# Patient Record
Sex: MALE | Race: WHITE | Employment: FULL TIME | URBAN - METROPOLITAN AREA
[De-identification: names, ages, dates, MRNs, and addresses within clinical notes are randomized per-mention and may not be internally consistent; named-entity substitution may affect disease eponyms.]

---

## 2021-02-19 RX ORDER — ESOMEPRAZOLE MAGNESIUM 40 MG/1
40 CAPSULE, DELAYED RELEASE ORAL
COMMUNITY
End: 2021-03-03

## 2021-02-22 ENCOUNTER — HOSPITAL ENCOUNTER (INPATIENT)
Dept: INTERVENTIONAL RADIOLOGY/VASCULAR | Facility: HOSPITAL | Age: 61
LOS: 1 days | Discharge: HOME | DRG: 247 | End: 2021-02-24
Attending: INTERNAL MEDICINE | Admitting: INTERNAL MEDICINE
Payer: COMMERCIAL

## 2021-02-22 DIAGNOSIS — R07.2 PRECORDIAL PAIN: ICD-10-CM

## 2021-02-22 DIAGNOSIS — I10 HYPERTENSION, UNSPECIFIED TYPE: ICD-10-CM

## 2021-02-22 PROCEDURE — 99152 MOD SED SAME PHYS/QHP 5/>YRS: CPT

## 2021-02-22 PROCEDURE — 92921 HC PTCA EA ADDL MAJOR CORONARY ARTERY/BRANCH: CPT

## 2021-02-22 PROCEDURE — 93010 ELECTROCARDIOGRAM REPORT: CPT | Performed by: INTERNAL MEDICINE

## 2021-02-22 PROCEDURE — 027135Z DILATION OF CORONARY ARTERY, TWO ARTERIES WITH TWO DRUG-ELUTING INTRALUMINAL DEVICES, PERCUTANEOUS APPROACH: ICD-10-PCS | Performed by: INTERNAL MEDICINE

## 2021-02-22 PROCEDURE — 99153 MOD SED SAME PHYS/QHP EA: CPT

## 2021-02-22 PROCEDURE — 99211 OFF/OP EST MAY X REQ PHY/QHP: CPT

## 2021-02-22 PROCEDURE — 4A023N7 MEASUREMENT OF CARDIAC SAMPLING AND PRESSURE, LEFT HEART, PERCUTANEOUS APPROACH: ICD-10-PCS | Performed by: INTERNAL MEDICINE

## 2021-02-22 PROCEDURE — 93458 L HRT ARTERY/VENTRICLE ANGIO: CPT

## 2021-02-22 PROCEDURE — B2111ZZ FLUOROSCOPY OF MULTIPLE CORONARY ARTERIES USING LOW OSMOLAR CONTRAST: ICD-10-PCS | Performed by: INTERNAL MEDICINE

## 2021-02-22 PROCEDURE — B2151ZZ FLUOROSCOPY OF LEFT HEART USING LOW OSMOLAR CONTRAST: ICD-10-PCS | Performed by: INTERNAL MEDICINE

## 2021-02-22 PROCEDURE — 93005 ELECTROCARDIOGRAM TRACING: CPT

## 2021-02-22 RX ORDER — MIDAZOLAM HYDROCHLORIDE 1 MG/ML
INJECTION INTRAMUSCULAR; INTRAVENOUS
Status: COMPLETED
Start: 2021-02-22 | End: 2021-02-22

## 2021-02-22 RX ORDER — CLOPIDOGREL BISULFATE 75 MG/1
75 TABLET ORAL DAILY
Status: DISCONTINUED | OUTPATIENT
Start: 2021-02-23 | End: 2021-02-24

## 2021-02-22 RX ORDER — CLOPIDOGREL BISULFATE 75 MG/1
75 TABLET ORAL DAILY
Qty: 90 TABLET | Refills: 3 | Status: SHIPPED | OUTPATIENT
Start: 2021-02-22 | End: 2021-04-02

## 2021-02-22 RX ORDER — VERAPAMIL HYDROCHLORIDE 2.5 MG/ML
INJECTION, SOLUTION INTRAVENOUS
Status: COMPLETED
Start: 2021-02-22 | End: 2021-02-22

## 2021-02-22 RX ORDER — PANTOPRAZOLE SODIUM 40 MG/1
40 TABLET, DELAYED RELEASE ORAL
Status: DISCONTINUED | OUTPATIENT
Start: 2021-02-23 | End: 2021-02-24

## 2021-02-22 RX ORDER — ASPIRIN 81 MG/1
81 TABLET ORAL DAILY
Status: DISCONTINUED | OUTPATIENT
Start: 2021-02-23 | End: 2021-02-23

## 2021-02-22 RX ORDER — ROSUVASTATIN CALCIUM 20 MG/1
20 TABLET, COATED ORAL NIGHTLY
Qty: 30 TABLET | Refills: 5 | Status: SHIPPED | OUTPATIENT
Start: 2021-02-22 | End: 2021-04-02

## 2021-02-22 RX ORDER — SODIUM CHLORIDE 9 MG/ML
INJECTION, SOLUTION INTRAVENOUS
Status: COMPLETED | OUTPATIENT
Start: 2021-02-23 | End: 2021-02-22

## 2021-02-22 RX ORDER — ACETAMINOPHEN 325 MG/1
TABLET ORAL
Status: COMPLETED
Start: 2021-02-22 | End: 2021-02-22

## 2021-02-22 RX ORDER — IBUPROFEN 200 MG
TABLET ORAL
Status: COMPLETED
Start: 2021-02-22 | End: 2021-02-22

## 2021-02-22 RX ORDER — ASPIRIN 81 MG/1
TABLET, CHEWABLE ORAL
Status: COMPLETED
Start: 2021-02-22 | End: 2021-02-22

## 2021-02-22 RX ORDER — NITROGLYCERIN 20 MG/100ML
INJECTION INTRAVENOUS
Status: COMPLETED
Start: 2021-02-22 | End: 2021-02-22

## 2021-02-22 RX ORDER — ASPIRIN 81 MG/1
81 TABLET ORAL DAILY
Qty: 90 TABLET | Refills: 3 | Status: SHIPPED | OUTPATIENT
Start: 2021-02-22 | End: 2021-02-24

## 2021-02-22 RX ORDER — LIDOCAINE HYDROCHLORIDE 10 MG/ML
INJECTION, SOLUTION EPIDURAL; INFILTRATION; INTRACAUDAL; PERINEURAL
Status: COMPLETED
Start: 2021-02-22 | End: 2021-02-22

## 2021-02-22 RX ORDER — ASPIRIN 81 MG/1
324 TABLET, CHEWABLE ORAL ONCE
Status: COMPLETED | OUTPATIENT
Start: 2021-02-22 | End: 2021-02-22

## 2021-02-22 RX ORDER — HEPARIN SODIUM 5000 [USP'U]/ML
INJECTION, SOLUTION INTRAVENOUS; SUBCUTANEOUS
Status: COMPLETED
Start: 2021-02-22 | End: 2021-02-22

## 2021-02-22 RX ORDER — CLOPIDOGREL BISULFATE 75 MG/1
TABLET ORAL
Status: COMPLETED
Start: 2021-02-22 | End: 2021-02-22

## 2021-02-22 RX ORDER — SODIUM CHLORIDE 9 MG/ML
INJECTION, SOLUTION INTRAVENOUS CONTINUOUS
Status: ACTIVE | OUTPATIENT
Start: 2021-02-22 | End: 2021-02-22

## 2021-02-22 RX ADMIN — IBUPROFEN 400 MG: 200 MG TABLET ORAL at 15:36:00

## 2021-02-22 RX ADMIN — SODIUM CHLORIDE: 9 INJECTION, SOLUTION INTRAVENOUS at 09:21:00

## 2021-02-22 RX ADMIN — ASPIRIN 324 MG: 81 TABLET, CHEWABLE ORAL at 09:30:00

## 2021-02-22 RX ADMIN — ACETAMINOPHEN 650 MG: 325 TABLET ORAL at 14:16:00

## 2021-02-22 NOTE — H&P
Patient seen and examined independently. H and P reviewed. No changes in H and P. Risks and benefits of procedure were discussed with patient. Airway examined. Patient is ASA class 2 and mallampati class 2. Pt is appropriate for conscious sedation.  No his Radial     2. Essential hypertension  - continue lisinopril        3.  Screening, lipid  - lipids today.           Follow-up: 1 month        HPI:    Shanika Mcmullen is a 64year old male who is referred for evaluation of chest pain.     Very nice patient of no acute distress  HEENT: Extraocular movements are intact; sclerae are anicteric; scalp is atrauamatic; no thyromegaly  Neck: Supple; no JVD; no carotid bruits  Cardiac: Regular rate and regular rhythm; no murmurs/rubs/gallops are appreciated; PMI is non-

## 2021-02-22 NOTE — PROGRESS NOTES
Rc'd pt from cath lab in stable condition s/p PCI to LAD. VSS. VascBand to right wrist in place with 11mls of air. Good pleth and pulse. No bleeding or hematoma, soft. EKG done. Dr Dionna Shaver at bedside along with wife. Pt tolerating po intake well.      14:00: Pt

## 2021-02-22 NOTE — PROCEDURES
[address] [facility] Location: Cath Lab    CSN [id number] MRN [id number]   Admission Date 2/22/2021 Procedure Date 2/22/2021   Attending Physician Austen Méndez MD Procedure Physician Ladi Whittington MD         CARDIAC CATHETERIZATION/PERCU pullback of the catheter. 2.  Selective coronary angiography:      Left main artery: The left main artery is a medium caliber, bifurcating vessel without significant angiographic disease.      LAD:  The left anterior descending artery is a medium caliber motion, no mitral regurgitation  2. Coronary angiography:  right dominant system  - LM: no significant angiographic disease  - LAD: 80% mid stenosis, 50% distal stenosis  - LCx: mild diffuse disease  - RCA: 50% distal stenosis  3.  Percutaneous coronary in

## 2021-02-22 NOTE — DIETARY NOTE
Clinical Nutrition    Dietitian consult received per cardiac rehab standing order. Pt to be educated by cardiac rehab staff and encouraged to attend outpatient classes taught by RD. SKYLA available PRN.     Sujata Mcgraw MS, RD, LDN  Clinical Dietitian  Page

## 2021-02-22 NOTE — CARDIAC REHAB
Cardiac rehab education completed with patient  Stent card given to patient  Patient referred to cardiac rehab  Patient to call cardiac rehab to schedule first appt

## 2021-02-23 ENCOUNTER — APPOINTMENT (OUTPATIENT)
Dept: CV DIAGNOSTICS | Facility: HOSPITAL | Age: 61
DRG: 247 | End: 2021-02-23
Attending: INTERNAL MEDICINE
Payer: COMMERCIAL

## 2021-02-23 ENCOUNTER — APPOINTMENT (OUTPATIENT)
Dept: GENERAL RADIOLOGY | Facility: HOSPITAL | Age: 61
DRG: 247 | End: 2021-02-23
Attending: PHYSICIAN ASSISTANT
Payer: COMMERCIAL

## 2021-02-23 ENCOUNTER — APPOINTMENT (OUTPATIENT)
Dept: INTERVENTIONAL RADIOLOGY/VASCULAR | Facility: HOSPITAL | Age: 61
DRG: 247 | End: 2021-02-23
Attending: INTERNAL MEDICINE
Payer: COMMERCIAL

## 2021-02-23 LAB
ATRIAL RATE: 48 BPM
ATRIAL RATE: 52 BPM
ATRIAL RATE: 62 BPM
CRP SERPL-MCNC: 1.35 MG/DL (ref ?–0.3)
P AXIS: 71 DEGREES
P AXIS: 71 DEGREES
P AXIS: 76 DEGREES
P-R INTERVAL: 132 MS
P-R INTERVAL: 138 MS
P-R INTERVAL: 140 MS
Q-T INTERVAL: 394 MS
Q-T INTERVAL: 432 MS
Q-T INTERVAL: 452 MS
QRS DURATION: 106 MS
QRS DURATION: 106 MS
QRS DURATION: 108 MS
QTC CALCULATION (BEZET): 399 MS
QTC CALCULATION (BEZET): 401 MS
QTC CALCULATION (BEZET): 403 MS
R AXIS: 59 DEGREES
R AXIS: 70 DEGREES
R AXIS: 73 DEGREES
SED RATE-ML: 8 MM/HR
T AXIS: 42 DEGREES
T AXIS: 50 DEGREES
T AXIS: 61 DEGREES
VENTRICULAR RATE: 48 BPM
VENTRICULAR RATE: 52 BPM
VENTRICULAR RATE: 62 BPM

## 2021-02-23 PROCEDURE — B2111ZZ FLUOROSCOPY OF MULTIPLE CORONARY ARTERIES USING LOW OSMOLAR CONTRAST: ICD-10-PCS | Performed by: INTERNAL MEDICINE

## 2021-02-23 PROCEDURE — 71045 X-RAY EXAM CHEST 1 VIEW: CPT | Performed by: PHYSICIAN ASSISTANT

## 2021-02-23 PROCEDURE — 93010 ELECTROCARDIOGRAM REPORT: CPT | Performed by: INTERNAL MEDICINE

## 2021-02-23 PROCEDURE — 99152 MOD SED SAME PHYS/QHP 5/>YRS: CPT

## 2021-02-23 PROCEDURE — 93454 CORONARY ARTERY ANGIO S&I: CPT

## 2021-02-23 PROCEDURE — 86140 C-REACTIVE PROTEIN: CPT | Performed by: PHYSICIAN ASSISTANT

## 2021-02-23 PROCEDURE — 93306 TTE W/DOPPLER COMPLETE: CPT | Performed by: INTERNAL MEDICINE

## 2021-02-23 PROCEDURE — 99153 MOD SED SAME PHYS/QHP EA: CPT

## 2021-02-23 PROCEDURE — 85652 RBC SED RATE AUTOMATED: CPT | Performed by: PHYSICIAN ASSISTANT

## 2021-02-23 PROCEDURE — 93005 ELECTROCARDIOGRAM TRACING: CPT

## 2021-02-23 RX ORDER — MIDAZOLAM HYDROCHLORIDE 1 MG/ML
INJECTION INTRAMUSCULAR; INTRAVENOUS
Status: COMPLETED
Start: 2021-02-23 | End: 2021-02-23

## 2021-02-23 RX ORDER — LIDOCAINE HYDROCHLORIDE 10 MG/ML
INJECTION, SOLUTION EPIDURAL; INFILTRATION; INTRACAUDAL; PERINEURAL
Status: COMPLETED
Start: 2021-02-23 | End: 2021-02-23

## 2021-02-23 RX ORDER — MORPHINE SULFATE 2 MG/ML
2 INJECTION, SOLUTION INTRAMUSCULAR; INTRAVENOUS EVERY 2 HOUR PRN
Status: DISCONTINUED | OUTPATIENT
Start: 2021-02-23 | End: 2021-02-24

## 2021-02-23 RX ORDER — HEPARIN SODIUM 5000 [USP'U]/ML
INJECTION, SOLUTION INTRAVENOUS; SUBCUTANEOUS
Status: COMPLETED
Start: 2021-02-23 | End: 2021-02-23

## 2021-02-23 RX ORDER — MORPHINE SULFATE 4 MG/ML
1 INJECTION, SOLUTION INTRAMUSCULAR; INTRAVENOUS EVERY 2 HOUR PRN
Status: DISCONTINUED | OUTPATIENT
Start: 2021-02-23 | End: 2021-02-24

## 2021-02-23 RX ORDER — VERAPAMIL HYDROCHLORIDE 2.5 MG/ML
INJECTION, SOLUTION INTRAVENOUS
Status: COMPLETED
Start: 2021-02-23 | End: 2021-02-23

## 2021-02-23 RX ORDER — IBUPROFEN 600 MG/1
600 TABLET ORAL 3 TIMES DAILY
Status: DISCONTINUED | OUTPATIENT
Start: 2021-02-23 | End: 2021-02-23

## 2021-02-23 RX ORDER — MORPHINE SULFATE 4 MG/ML
2 INJECTION, SOLUTION INTRAMUSCULAR; INTRAVENOUS EVERY 2 HOUR PRN
Status: DISCONTINUED | OUTPATIENT
Start: 2021-02-23 | End: 2021-02-24

## 2021-02-23 RX ORDER — COLCHICINE 0.6 MG/1
0.6 TABLET ORAL 2 TIMES DAILY
Status: DISCONTINUED | OUTPATIENT
Start: 2021-02-23 | End: 2021-02-24

## 2021-02-23 RX ORDER — NITROGLYCERIN 20 MG/100ML
INJECTION INTRAVENOUS
Status: COMPLETED
Start: 2021-02-23 | End: 2021-02-23

## 2021-02-23 RX ORDER — ASPIRIN 325 MG
650 TABLET, DELAYED RELEASE (ENTERIC COATED) ORAL 3 TIMES DAILY
Status: DISCONTINUED | OUTPATIENT
Start: 2021-02-23 | End: 2021-02-24

## 2021-02-23 RX ORDER — MORPHINE SULFATE 4 MG/ML
4 INJECTION, SOLUTION INTRAMUSCULAR; INTRAVENOUS EVERY 2 HOUR PRN
Status: DISCONTINUED | OUTPATIENT
Start: 2021-02-23 | End: 2021-02-24

## 2021-02-23 RX ORDER — ROSUVASTATIN CALCIUM 20 MG/1
20 TABLET, COATED ORAL NIGHTLY
Status: DISCONTINUED | OUTPATIENT
Start: 2021-02-23 | End: 2021-02-24

## 2021-02-23 RX ORDER — COLCHICINE 0.6 MG/1
0.6 TABLET ORAL 2 TIMES DAILY
Status: DISCONTINUED | OUTPATIENT
Start: 2021-02-23 | End: 2021-02-23

## 2021-02-23 RX ADMIN — ASPIRIN 650 MG: 325 MG TABLET, DELAYED RELEASE (ENTERIC COATED) ORAL at 21:24:00

## 2021-02-23 RX ADMIN — ASPIRIN 650 MG: 325 MG TABLET, DELAYED RELEASE (ENTERIC COATED) ORAL at 15:31:00

## 2021-02-23 RX ADMIN — ROSUVASTATIN CALCIUM 20 MG: 20 TABLET, COATED ORAL at 21:24:00

## 2021-02-23 RX ADMIN — CLOPIDOGREL BISULFATE 75 MG: 75 TABLET ORAL at 08:34:00

## 2021-02-23 RX ADMIN — MORPHINE SULFATE 2 MG: 2 INJECTION, SOLUTION INTRAMUSCULAR; INTRAVENOUS at 04:53:00

## 2021-02-23 RX ADMIN — MORPHINE SULFATE 2 MG: 2 INJECTION, SOLUTION INTRAMUSCULAR; INTRAVENOUS at 01:59:00

## 2021-02-23 RX ADMIN — ASPIRIN 81 MG: 81 TABLET ORAL at 08:33:00

## 2021-02-23 RX ADMIN — IBUPROFEN 600 MG: 600 TABLET ORAL at 09:49:00

## 2021-02-23 RX ADMIN — PANTOPRAZOLE SODIUM 40 MG: 40 TABLET, DELAYED RELEASE ORAL at 07:00:00

## 2021-02-23 RX ADMIN — COLCHICINE 0.6 MG: 0.6 TABLET ORAL at 15:31:00

## 2021-02-23 NOTE — PROCEDURES
[address] [facility] Location: Cath Lab    CSN [id number] MRN [id number]   Admission Date 2/22/2021 Procedure Date 2/23/2021   Attending Physician Stephanie Loya MD Procedure Physician Mike Simmons [address] [place] [address] The proximal LAD demonstrates a moderate 50-60% stenosis. The previously deployed mid LAD stents are widely patent. Distally, there is a 60% focal stenosis at the takeoff of the third diagonal branch.   No contrast extravasation is noted from the LAD or

## 2021-02-23 NOTE — PROGRESS NOTES
Pt received from cath lab via bed. A&OX4. On room air saturating 100%. C/o chest pressure/pain rated 5/10. States it is worse when he stands and walks. Declines pain medication at this time.  Right radial site clean and intact, coban dressing in place, good

## 2021-02-23 NOTE — PLAN OF CARE
Patient alert and oriented x4 on room air , sinus on monitor rhythm normal ,hr controlled, s/p PCI with CHICHI X2   , RT wrist surgical site no swelling/ bleeding, site intact , s/p procedure, persistent mild pain resting time  2- 3 level , while activity / a appropriate  Outcome: Progressing

## 2021-02-23 NOTE — PROGRESS NOTES
Pt received from cath lab, placed on tele, Right wrist with radial band(11 ml air), soft on palpation around the band, radial pulses palpable, vital signs and site assessment per post procedure orders.  Patient instructed not to flex right wrist/move right

## 2021-02-23 NOTE — PLAN OF CARE
Pt A&O x 4, SPO2 96% on RA, sinus rhythm on tele with ST elevation, up with standby assist. Right wrist dressing CDI, soft on palpation, distal radial pulse palpable. C/o chest pain radiating to neck, worse with deep breathing and activity.  Evaluated by  Encourage food from home; allow for food preferences  - Enhance eating environment  Outcome: Progressing     Problem: METABOLIC/FLUID AND ELECTROLYTES - ADULT  Goal: Electrolytes maintained within normal limits  Description: INTERVENTIONS:  - Monitor labs

## 2021-02-23 NOTE — PROGRESS NOTES
ADDENDUM:  The patient was personally seen and examined by me. I agree with the note written below with the following comments:    S: persistent cp today, worse with standing/walking. ECG shows SR with diffuse ST elevation- pericardial process?       O: BP in AM to re-assess effusion.               [place] [address] [facility]  Progress Note    Bird Aviles Patient Status:  Observation    1960 MRN [id number]   [facility] 8NE-A Attending Francesca Avina MD   Hosp Day # 0 PCP Uday Caicedo catheterization: LVEDP 4mmHg, no aortic stenosis. LVEF 60% with normal wall motion, no mitral regurgitation  2.   Coronary angiography:  right dominant system  - LM: no significant angiographic disease  - LAD: 80% mid stenosis, 50% distal stenosis  - LCx:

## 2021-02-23 NOTE — CONSULTS
[facility] Hospitalist Initial Consult         Chief Complaint:       PCP: Hillary Fritz DO      History of Present Illness: Patient is a 64year old male with PMH sig for GERD, HTN, CAD,  who underwent cardiac cath yesterday w CHICHI X 2 to LAD.       Since procedu Septum midline. Mucosa normal. No drainage.    Throat: Lips, mucosa, and tongue normal. Teeth and gums normal.   Neck: Supple, symmetrical, trachea midline, no cervical or supraclavicular lymph adenopathy, thyroid: no enlargment/tenderness/nodules appreciat Transthoracic stress echocardiography. Image quality was adequate. Images were captured at baseline and peak exercise. Stress protocol: +---------------+---+------------+----------------------------+ ! Stage          ! HR ! BP (mmHg)   ! Symptoms at peak stress, consistent with myocardial ischemia. Baseline: Peak stress: Stress echo results:      The mid anterior and mid anteroseptal wall shows a new regional wall motion abnormality during stress, indicating stress-induced ischemia. ---------------- present and assisted in the monitoring of the patient's level of consciousness and physiological status, watching the heart rate, blood pressure, oximetry, and rhythm, in addition to total moderation time.   ACCESS/CATHETER PLACEMENT:   The right radial are PROCEDURE: Heparin was used for systemic anticoagulation, confirmed therapeutic by ACT measurement.   The LM artery was engaged with a 6F EBU3.5 guide catheter; Runthrough and Prowater wires were advanced to the distal LAD and diagonal arteries, respectivel encounter.       Wing Martha GARCIA  [facility] Hospitalist  [phone]

## 2021-02-24 ENCOUNTER — APPOINTMENT (OUTPATIENT)
Dept: CV DIAGNOSTICS | Facility: HOSPITAL | Age: 61
DRG: 247 | End: 2021-02-24
Attending: INTERNAL MEDICINE
Payer: COMMERCIAL

## 2021-02-24 VITALS
OXYGEN SATURATION: 99 % | HEART RATE: 64 BPM | RESPIRATION RATE: 18 BRPM | WEIGHT: 172.63 LBS | HEIGHT: 74 IN | TEMPERATURE: 98 F | SYSTOLIC BLOOD PRESSURE: 108 MMHG | BODY MASS INDEX: 22.15 KG/M2 | DIASTOLIC BLOOD PRESSURE: 66 MMHG

## 2021-02-24 PROBLEM — I10 HYPERTENSION: Status: ACTIVE | Noted: 2021-02-24

## 2021-02-24 LAB
ATRIAL RATE: 65 BPM
P AXIS: 65 DEGREES
P-R INTERVAL: 126 MS
Q-T INTERVAL: 384 MS
QRS DURATION: 118 MS
QTC CALCULATION (BEZET): 399 MS
R AXIS: 59 DEGREES
T AXIS: 41 DEGREES
VENTRICULAR RATE: 65 BPM

## 2021-02-24 PROCEDURE — 93010 ELECTROCARDIOGRAM REPORT: CPT | Performed by: INTERNAL MEDICINE

## 2021-02-24 PROCEDURE — 93308 TTE F-UP OR LMTD: CPT | Performed by: INTERNAL MEDICINE

## 2021-02-24 PROCEDURE — 93005 ELECTROCARDIOGRAM TRACING: CPT

## 2021-02-24 RX ORDER — COLCHICINE 0.6 MG/1
0.6 TABLET ORAL 2 TIMES DAILY
Qty: 60 TABLET | Refills: 1 | Status: SHIPPED | OUTPATIENT
Start: 2021-02-24 | End: 2021-05-03

## 2021-02-24 RX ORDER — ACETAMINOPHEN 325 MG/1
650 TABLET ORAL EVERY 6 HOURS PRN
Status: DISCONTINUED | OUTPATIENT
Start: 2021-02-24 | End: 2021-02-24

## 2021-02-24 RX ORDER — ACETAMINOPHEN 325 MG/1
TABLET ORAL
Status: DISCONTINUED
Start: 2021-02-24 | End: 2021-02-24

## 2021-02-24 RX ORDER — ASPIRIN 81 MG/1
650 TABLET ORAL 3 TIMES DAILY
Qty: 90 TABLET | Refills: 3 | Status: SHIPPED | OUTPATIENT
Start: 2021-02-24 | End: 2021-05-03

## 2021-02-24 RX ADMIN — ASPIRIN 650 MG: 325 MG TABLET, DELAYED RELEASE (ENTERIC COATED) ORAL at 14:45:00

## 2021-02-24 RX ADMIN — COLCHICINE 0.6 MG: 0.6 TABLET ORAL at 08:02:00

## 2021-02-24 RX ADMIN — CLOPIDOGREL BISULFATE 75 MG: 75 TABLET ORAL at 08:02:00

## 2021-02-24 RX ADMIN — PANTOPRAZOLE SODIUM 40 MG: 40 TABLET, DELAYED RELEASE ORAL at 07:00:00

## 2021-02-24 RX ADMIN — ASPIRIN 650 MG: 325 MG TABLET, DELAYED RELEASE (ENTERIC COATED) ORAL at 08:02:00

## 2021-02-24 RX ADMIN — ACETAMINOPHEN 650 MG: 325 TABLET ORAL at 10:18:00

## 2021-02-24 NOTE — PLAN OF CARE
Pt A&O x 4, SPO2 96% on RA, sinus rhythm with ST elevation. Up ad inocencia. Right radial access site soft on palpation, dressing CDI, radial pulse palpable. Had limited Echocardiogram this AM, awaiting results. Maintained on Colchicine and  mg.  Denies an preferences  - Enhance eating environment  Outcome: Progressing     Problem: METABOLIC/FLUID AND ELECTROLYTES - ADULT  Goal: Electrolytes maintained within normal limits  Description: INTERVENTIONS:  - Monitor labs and rhythm and assess patient for signs a

## 2021-02-24 NOTE — PLAN OF CARE
S/P selective coronary angiography ,  Right radial artery site intact , no hamartoma /bleeding,   bed rest over , ambulated, pain condition much better ,mild  pain with Activity / ambulation time  , scheduled Asprin  administered and   Resting , sinus on t Continuous cardiac monitoring, monitor vital signs, obtain 12 lead EKG if indicated  - Evaluate effectiveness of antiarrhythmic and heart rate control medications as ordered  - Initiate emergency measures for life threatening arrhythmias  - Monitor electro

## 2021-02-24 NOTE — PROGRESS NOTES
[address] [facility] Cardiology  Progress Note    Andrei Guerrero Patient Status:  Observation    1960 MRN [id number]   [facility] 8NE-A Attending Sophie Hernandez MD   Hosp Day # 0 PCP Yumiko Upton DO     Impression:  1.  CAD s/p PCI normally      •  morphINE sulfate (PF) 2 MG/ML injection 2 mg, 2 mg, Intravenous, Q2H PRN    •  Rosuvastatin Calcium (CRESTOR) tab 20 mg, 20 mg, Oral, Nightly    •  aspirin EC EC tab 650 mg, 650 mg, Oral, TID    •  morphINE sulfate (PF) 4 MG/ML injection 1

## 2021-02-24 NOTE — PROGRESS NOTES
[facility] Hospitalist Team  Progress Note      Shannon Connell  6/17/1960    Assessment/Plan:       # CAD w PCI X 2 2/22 due to persistent cp  #Pericardial effusion  # acute pericarditis   #HL  -started on high dose asa and colchicine with improvement  - cont DA

## 2021-03-01 NOTE — DISCHARGE SUMMARY
[facility]  Discharge Summary    Shannon Connell Patient Status:  Inpatient    1960 MRN [id number]   [facility] 8NE-A Attending No att. providers found   Hosp Day # 1 PCP Yobany Golden DO     Date of Admission: 2021    Date Skin color, texture, turgor normal, no rashes or lesions   Lymph nodes:   Cervical, supraclavicular, and axillary nodes normal   Neurologic:   CNII-XII intact.  Normal strength, sensation and reflexes       throughout         History of Present Illness/Hosp

## 2021-04-13 ENCOUNTER — APPOINTMENT (OUTPATIENT)
Dept: CARDIAC REHAB | Facility: HOSPITAL | Age: 61
End: 2021-04-13
Attending: INTERNAL MEDICINE
Payer: COMMERCIAL

## 2021-04-16 PROBLEM — E78.00 PURE HYPERCHOLESTEROLEMIA: Status: ACTIVE | Noted: 2021-04-16

## 2021-04-16 PROBLEM — Z95.820 S/P ANGIOPLASTY WITH STENT: Status: ACTIVE | Noted: 2021-04-16

## 2021-04-16 PROBLEM — I25.10 CORONARY ARTERY DISEASE INVOLVING NATIVE CORONARY ARTERY OF NATIVE HEART WITHOUT ANGINA PECTORIS: Status: ACTIVE | Noted: 2021-04-16

## 2021-05-29 PROBLEM — G89.29 CHRONIC PAIN OF BOTH SHOULDERS: Status: ACTIVE | Noted: 2021-05-29

## 2021-05-29 PROBLEM — M25.561 CHRONIC PAIN OF RIGHT KNEE: Status: ACTIVE | Noted: 2021-05-29

## 2021-05-29 PROBLEM — M25.512 CHRONIC PAIN OF BOTH SHOULDERS: Status: ACTIVE | Noted: 2021-05-29

## 2021-05-29 PROBLEM — G89.29 CHRONIC PAIN OF RIGHT KNEE: Status: ACTIVE | Noted: 2021-05-29

## 2021-05-29 PROBLEM — M25.511 CHRONIC PAIN OF BOTH SHOULDERS: Status: ACTIVE | Noted: 2021-05-29

## 2021-06-17 PROBLEM — M25.612 DECREASED ROM OF LEFT SHOULDER: Status: ACTIVE | Noted: 2021-06-17

## 2021-06-17 PROBLEM — M25.611 DECREASED ROM OF RIGHT SHOULDER: Status: ACTIVE | Noted: 2021-06-17

## 2021-12-29 PROBLEM — M94.262 CHONDROMALACIA OF LEFT KNEE: Status: ACTIVE | Noted: 2021-12-29

## 2021-12-29 PROBLEM — M23.92 INTERNAL DERANGEMENT OF LEFT KNEE: Status: ACTIVE | Noted: 2021-12-29

## 2023-01-11 ENCOUNTER — HOSPITAL ENCOUNTER (OUTPATIENT)
Facility: HOSPITAL | Age: 63
Setting detail: OBSERVATION
Discharge: HOME | End: 2023-01-14
Attending: EMERGENCY MEDICINE | Admitting: HOSPITALIST
Payer: COMMERCIAL

## 2023-01-11 ENCOUNTER — APPOINTMENT (OUTPATIENT)
Dept: GENERAL RADIOLOGY | Age: 63
End: 2023-01-11
Payer: COMMERCIAL

## 2023-01-11 DIAGNOSIS — I20.0 UNSTABLE ANGINA (HCC): Primary | ICD-10-CM

## 2023-01-11 LAB
ALBUMIN SERPL-MCNC: 4.4 G/DL (ref 3.4–5)
ALBUMIN/GLOB SERPL: 1.4 {RATIO} (ref 1–2)
ALP LIVER SERPL-CCNC: 53 U/L
ALT SERPL-CCNC: 30 U/L
ANION GAP SERPL CALC-SCNC: 5 MMOL/L (ref 0–18)
AST SERPL-CCNC: 19 U/L (ref 15–37)
ATRIAL RATE: 52 BPM
BASOPHILS # BLD AUTO: 0.04 X10(3) UL (ref 0–0.2)
BASOPHILS NFR BLD AUTO: 0.7 %
BILIRUB SERPL-MCNC: 2 MG/DL (ref 0.1–2)
BUN BLD-MCNC: 13 MG/DL (ref 7–18)
CALCIUM BLD-MCNC: 9.5 MG/DL (ref 8.5–10.1)
CHLORIDE SERPL-SCNC: 105 MMOL/L (ref 98–112)
CO2 SERPL-SCNC: 28 MMOL/L (ref 21–32)
CREAT BLD-MCNC: 0.98 MG/DL
D DIMER PPP FEU-MCNC: <0.27 UG/ML FEU (ref ?–0.6)
EOSINOPHIL # BLD AUTO: 0.22 X10(3) UL (ref 0–0.7)
EOSINOPHIL NFR BLD AUTO: 3.9 %
ERYTHROCYTE [DISTWIDTH] IN BLOOD BY AUTOMATED COUNT: 12.1 %
GFR SERPLBLD BASED ON 1.73 SQ M-ARVRAT: 88 ML/MIN/1.73M2 (ref 60–?)
GLOBULIN PLAS-MCNC: 3.2 G/DL (ref 2.8–4.4)
GLUCOSE BLD-MCNC: 136 MG/DL (ref 70–99)
HCT VFR BLD AUTO: 44.3 %
HGB BLD-MCNC: 14.7 G/DL
IMM GRANULOCYTES # BLD AUTO: 0.02 X10(3) UL (ref 0–1)
IMM GRANULOCYTES NFR BLD: 0.4 %
LYMPHOCYTES # BLD AUTO: 1.45 X10(3) UL (ref 1–4)
LYMPHOCYTES NFR BLD AUTO: 26 %
MCH RBC QN AUTO: 30.4 PG (ref 26–34)
MCHC RBC AUTO-ENTMCNC: 33.2 G/DL (ref 31–37)
MCV RBC AUTO: 91.7 FL
MONOCYTES # BLD AUTO: 0.56 X10(3) UL (ref 0.1–1)
MONOCYTES NFR BLD AUTO: 10.1 %
NEUTROPHILS # BLD AUTO: 3.28 X10 (3) UL (ref 1.5–7.7)
NEUTROPHILS # BLD AUTO: 3.28 X10(3) UL (ref 1.5–7.7)
NEUTROPHILS NFR BLD AUTO: 58.9 %
OSMOLALITY SERPL CALC.SUM OF ELEC: 288 MOSM/KG (ref 275–295)
P AXIS: 26 DEGREES
P-R INTERVAL: 134 MS
PLATELET # BLD AUTO: 185 10(3)UL (ref 150–450)
POTASSIUM SERPL-SCNC: 4.1 MMOL/L (ref 3.5–5.1)
PROT SERPL-MCNC: 7.6 G/DL (ref 6.4–8.2)
Q-T INTERVAL: 472 MS
QRS DURATION: 104 MS
QTC CALCULATION (BEZET): 438 MS
R AXIS: 48 DEGREES
RBC # BLD AUTO: 4.83 X10(6)UL
SARS-COV-2 RNA RESP QL NAA+PROBE: NOT DETECTED
SODIUM SERPL-SCNC: 138 MMOL/L (ref 136–145)
T AXIS: 33 DEGREES
TROPONIN I HIGH SENSITIVITY: 11 NG/L
TROPONIN I HIGH SENSITIVITY: 11 NG/L
TROPONIN I HIGH SENSITIVITY: 12 NG/L
VENTRICULAR RATE: 52 BPM
WBC # BLD AUTO: 5.6 X10(3) UL (ref 4–11)

## 2023-01-11 PROCEDURE — 84484 ASSAY OF TROPONIN QUANT: CPT | Performed by: HOSPITALIST

## 2023-01-11 PROCEDURE — 36415 COLL VENOUS BLD VENIPUNCTURE: CPT

## 2023-01-11 PROCEDURE — 93010 ELECTROCARDIOGRAM REPORT: CPT

## 2023-01-11 PROCEDURE — 71045 X-RAY EXAM CHEST 1 VIEW: CPT | Performed by: EMERGENCY MEDICINE

## 2023-01-11 PROCEDURE — 93005 ELECTROCARDIOGRAM TRACING: CPT

## 2023-01-11 PROCEDURE — 85379 FIBRIN DEGRADATION QUANT: CPT | Performed by: EMERGENCY MEDICINE

## 2023-01-11 PROCEDURE — 80053 COMPREHEN METABOLIC PANEL: CPT | Performed by: EMERGENCY MEDICINE

## 2023-01-11 PROCEDURE — 85025 COMPLETE CBC W/AUTO DIFF WBC: CPT | Performed by: EMERGENCY MEDICINE

## 2023-01-11 PROCEDURE — 99285 EMERGENCY DEPT VISIT HI MDM: CPT

## 2023-01-11 PROCEDURE — 93010 ELECTROCARDIOGRAM REPORT: CPT | Performed by: INTERNAL MEDICINE

## 2023-01-11 PROCEDURE — 84484 ASSAY OF TROPONIN QUANT: CPT | Performed by: EMERGENCY MEDICINE

## 2023-01-11 RX ORDER — ACETAMINOPHEN 325 MG/1
650 TABLET ORAL EVERY 6 HOURS PRN
Status: DISCONTINUED | OUTPATIENT
Start: 2023-01-11 | End: 2023-01-14

## 2023-01-11 RX ORDER — METOPROLOL SUCCINATE 25 MG/1
25 TABLET, EXTENDED RELEASE ORAL
Status: DISCONTINUED | OUTPATIENT
Start: 2023-01-12 | End: 2023-01-14

## 2023-01-11 RX ORDER — ASPIRIN 325 MG
325 TABLET, DELAYED RELEASE (ENTERIC COATED) ORAL DAILY
Status: DISCONTINUED | OUTPATIENT
Start: 2023-01-11 | End: 2023-01-12

## 2023-01-11 RX ORDER — SODIUM CHLORIDE 9 MG/ML
INJECTION, SOLUTION INTRAVENOUS ONCE
Status: DISCONTINUED | OUTPATIENT
Start: 2023-01-11 | End: 2023-01-11

## 2023-01-11 RX ORDER — ROSUVASTATIN CALCIUM 20 MG/1
20 TABLET, COATED ORAL NIGHTLY
Status: DISCONTINUED | OUTPATIENT
Start: 2023-01-11 | End: 2023-01-14

## 2023-01-11 RX ORDER — ASPIRIN 81 MG/1
324 TABLET, CHEWABLE ORAL ONCE
Status: COMPLETED | OUTPATIENT
Start: 2023-01-11 | End: 2023-01-11

## 2023-01-11 RX ORDER — PANTOPRAZOLE SODIUM 40 MG/1
40 TABLET, DELAYED RELEASE ORAL
Status: DISCONTINUED | OUTPATIENT
Start: 2023-01-12 | End: 2023-01-14

## 2023-01-11 RX ORDER — ROSUVASTATIN CALCIUM 20 MG/1
20 TABLET, COATED ORAL NIGHTLY
COMMUNITY
Start: 2022-07-01 | End: 2023-06-26

## 2023-01-11 RX ORDER — ACETAMINOPHEN 500 MG
500 TABLET ORAL EVERY 4 HOURS PRN
Status: DISCONTINUED | OUTPATIENT
Start: 2023-01-11 | End: 2023-01-11

## 2023-01-11 RX ORDER — METOPROLOL SUCCINATE 25 MG/1
25 TABLET, EXTENDED RELEASE ORAL DAILY
COMMUNITY
Start: 2022-07-01 | End: 2023-06-26

## 2023-01-11 RX ORDER — ASPIRIN 81 MG/1
81 TABLET ORAL DAILY
COMMUNITY
End: 2023-01-14

## 2023-01-11 NOTE — ED QUICK NOTES
Orders for admission, patient is aware of plan and ready to go upstairs. Any questions, please call ED EDWARD Dill  at extension [phone].      Vaccinated:Yes  Type of COVID test sent:Rapid  COVID Suspicion level: Low      Titratable drug(s) infusing:N/A    LOC at time of transport:A&Ox4    Other pertinent information:    CIWA score=N/A  NIH score=N/A

## 2023-01-11 NOTE — ED INITIAL ASSESSMENT (HPI)
C/o chest pain x1 week states today worse and c/o dizziness that started today with some SOB has appt with cardiology tomorrow

## 2023-01-11 NOTE — ED QUICK NOTES
Spoke to nursing supervisor. May be another 1-2 hours before inpatient bed is available. Pt updated on plan of care. No distress. Denies any pain.

## 2023-01-12 ENCOUNTER — APPOINTMENT (OUTPATIENT)
Dept: CV DIAGNOSTICS | Facility: HOSPITAL | Age: 63
End: 2023-01-12
Attending: INTERNAL MEDICINE
Payer: COMMERCIAL

## 2023-01-12 LAB
ANION GAP SERPL CALC-SCNC: 6 MMOL/L (ref 0–18)
ATRIAL RATE: 53 BPM
BUN BLD-MCNC: 13 MG/DL (ref 7–18)
CALCIUM BLD-MCNC: 9.6 MG/DL (ref 8.5–10.1)
CHLORIDE SERPL-SCNC: 105 MMOL/L (ref 98–112)
CO2 SERPL-SCNC: 31 MMOL/L (ref 21–32)
CREAT BLD-MCNC: 0.89 MG/DL
ERYTHROCYTE [DISTWIDTH] IN BLOOD BY AUTOMATED COUNT: 12 %
GFR SERPLBLD BASED ON 1.73 SQ M-ARVRAT: 98 ML/MIN/1.73M2 (ref 60–?)
GLUCOSE BLD-MCNC: 100 MG/DL (ref 70–99)
HCT VFR BLD AUTO: 42.4 %
HGB BLD-MCNC: 13.9 G/DL
MAGNESIUM SERPL-MCNC: 2.1 MG/DL (ref 1.6–2.6)
MCH RBC QN AUTO: 30.2 PG (ref 26–34)
MCHC RBC AUTO-ENTMCNC: 32.8 G/DL (ref 31–37)
MCV RBC AUTO: 92.2 FL
OSMOLALITY SERPL CALC.SUM OF ELEC: 294 MOSM/KG (ref 275–295)
P AXIS: 26 DEGREES
P-R INTERVAL: 144 MS
PLATELET # BLD AUTO: 172 10(3)UL (ref 150–450)
POTASSIUM SERPL-SCNC: 3.9 MMOL/L (ref 3.5–5.1)
Q-T INTERVAL: 454 MS
QRS DURATION: 96 MS
QTC CALCULATION (BEZET): 426 MS
R AXIS: 52 DEGREES
RBC # BLD AUTO: 4.6 X10(6)UL
SODIUM SERPL-SCNC: 142 MMOL/L (ref 136–145)
T AXIS: 43 DEGREES
VENTRICULAR RATE: 53 BPM
WBC # BLD AUTO: 4.8 X10(3) UL (ref 4–11)

## 2023-01-12 PROCEDURE — 93306 TTE W/DOPPLER COMPLETE: CPT | Performed by: INTERNAL MEDICINE

## 2023-01-12 PROCEDURE — 85027 COMPLETE CBC AUTOMATED: CPT | Performed by: HOSPITALIST

## 2023-01-12 PROCEDURE — 83735 ASSAY OF MAGNESIUM: CPT | Performed by: HOSPITALIST

## 2023-01-12 PROCEDURE — 80048 BASIC METABOLIC PNL TOTAL CA: CPT | Performed by: HOSPITALIST

## 2023-01-12 RX ORDER — ASPIRIN 81 MG/1
81 TABLET ORAL DAILY
Status: DISCONTINUED | OUTPATIENT
Start: 2023-01-12 | End: 2023-01-13

## 2023-01-12 NOTE — PLAN OF CARE
Alert and oriented x4 on tele monitor hr 50's sinus cam. C/o headache and tylenol 650 mg given as ordered with relief. Updated w/ poc and verbalized understanding. All needs attended and will continue to monitor. Call light within reach. Problem: CARDIOVASCULAR - ADULT  Goal: Maintains optimal cardiac output and hemodynamic stability  Description: INTERVENTIONS:  - Monitor vital signs, rhythm, and trends  - Monitor for bleeding, hypotension and signs of decreased cardiac output  - Evaluate effectiveness of vasoactive medications to optimize hemodynamic stability  - Monitor arterial and/or venous puncture sites for bleeding and/or hematoma  - Assess quality of pulses, skin color and temperature  - Assess for signs of decreased coronary artery perfusion - ex.  Angina  - Evaluate fluid balance, assess for edema, trend weights  Outcome: Progressing  Goal: Absence of cardiac arrhythmias or at baseline  Description: INTERVENTIONS:  - Continuous cardiac monitoring, monitor vital signs, obtain 12 lead EKG if indicated  - Evaluate effectiveness of antiarrhythmic and heart rate control medications as ordered  - Initiate emergency measures for life threatening arrhythmias  - Monitor electrolytes and administer replacement therapy as ordered  Outcome: Progressing

## 2023-01-12 NOTE — PLAN OF CARE
Patient Aox4. Patient sating at 95% on room air. No complaints of pain at the moment. Patient stated that occasionally he has intermittent pain in his left sternum while resting. Rates it 1/10. Patient SB on tele monitor. Cath planned for tomorrow. Patient up SBA due to occasional chest discomfort. Will place on cardiac diet. NPO at midnight. Call light within reach and bed alarm activated. Problem: Patient/Family Goals  Goal: Patient/Family Long Term Goal  Description: Patient's Long Term Goal: no chest pain    Interventions:  - cath tomorrow  - See additional Care Plan goals for specific interventions  Outcome: Progressing  Goal: Patient/Family Short Term Goal  Description: Patient's Short Term Goal: no falls     Interventions:   - call light within reach  - See additional Care Plan goals for specific interventions  Outcome: Progressing     Problem: CARDIOVASCULAR - ADULT  Goal: Maintains optimal cardiac output and hemodynamic stability  Description: INTERVENTIONS:  - Monitor vital signs, rhythm, and trends  - Monitor for bleeding, hypotension and signs of decreased cardiac output  - Evaluate effectiveness of vasoactive medications to optimize hemodynamic stability  - Monitor arterial and/or venous puncture sites for bleeding and/or hematoma  - Assess quality of pulses, skin color and temperature  - Assess for signs of decreased coronary artery perfusion - ex.  Angina  - Evaluate fluid balance, assess for edema, trend weights  Outcome: Progressing  Goal: Absence of cardiac arrhythmias or at baseline  Description: INTERVENTIONS:  - Continuous cardiac monitoring, monitor vital signs, obtain 12 lead EKG if indicated  - Evaluate effectiveness of antiarrhythmic and heart rate control medications as ordered  - Initiate emergency measures for life threatening arrhythmias  - Monitor electrolytes and administer replacement therapy as ordered  Outcome: Progressing

## 2023-01-12 NOTE — PLAN OF CARE
[phone]. Alert and Oriented x4. Tele Rhythm SB. O2 saturation 97% On room air. Breath sounds clear. pain better at this time. Dr Stefania Thompson called for admission orders. EKG and troponin ordered. Pt oriented to room and unit. Reviewed plan of care and patient verbalizes understanding.

## 2023-01-13 ENCOUNTER — APPOINTMENT (OUTPATIENT)
Dept: INTERVENTIONAL RADIOLOGY/VASCULAR | Facility: HOSPITAL | Age: 63
End: 2023-01-13
Attending: INTERNAL MEDICINE
Payer: COMMERCIAL

## 2023-01-13 LAB
ANION GAP SERPL CALC-SCNC: 6 MMOL/L (ref 0–18)
BUN BLD-MCNC: 16 MG/DL (ref 7–18)
CALCIUM BLD-MCNC: 9.3 MG/DL (ref 8.5–10.1)
CHLORIDE SERPL-SCNC: 107 MMOL/L (ref 98–112)
CO2 SERPL-SCNC: 30 MMOL/L (ref 21–32)
CREAT BLD-MCNC: 0.98 MG/DL
ERYTHROCYTE [DISTWIDTH] IN BLOOD BY AUTOMATED COUNT: 12 %
GFR SERPLBLD BASED ON 1.73 SQ M-ARVRAT: 88 ML/MIN/1.73M2 (ref 60–?)
GLUCOSE BLD-MCNC: 109 MG/DL (ref 70–99)
HCT VFR BLD AUTO: 42.9 %
HGB BLD-MCNC: 14.2 G/DL
MAGNESIUM SERPL-MCNC: 2.3 MG/DL (ref 1.6–2.6)
MCH RBC QN AUTO: 30.8 PG (ref 26–34)
MCHC RBC AUTO-ENTMCNC: 33.1 G/DL (ref 31–37)
MCV RBC AUTO: 93.1 FL
OSMOLALITY SERPL CALC.SUM OF ELEC: 298 MOSM/KG (ref 275–295)
PLATELET # BLD AUTO: 180 10(3)UL (ref 150–450)
POTASSIUM SERPL-SCNC: 4 MMOL/L (ref 3.5–5.1)
RBC # BLD AUTO: 4.61 X10(6)UL
SODIUM SERPL-SCNC: 143 MMOL/L (ref 136–145)
WBC # BLD AUTO: 5.2 X10(3) UL (ref 4–11)

## 2023-01-13 PROCEDURE — B2151ZZ FLUOROSCOPY OF LEFT HEART USING LOW OSMOLAR CONTRAST: ICD-10-PCS | Performed by: INTERNAL MEDICINE

## 2023-01-13 PROCEDURE — 93458 L HRT ARTERY/VENTRICLE ANGIO: CPT | Performed by: INTERNAL MEDICINE

## 2023-01-13 PROCEDURE — 83735 ASSAY OF MAGNESIUM: CPT | Performed by: HOSPITALIST

## 2023-01-13 PROCEDURE — 4A023N8 MEASUREMENT OF CARDIAC SAMPLING AND PRESSURE, BILATERAL, PERCUTANEOUS APPROACH: ICD-10-PCS | Performed by: INTERNAL MEDICINE

## 2023-01-13 PROCEDURE — 85027 COMPLETE CBC AUTOMATED: CPT | Performed by: HOSPITALIST

## 2023-01-13 PROCEDURE — 4A033BC MEASUREMENT OF ARTERIAL PRESSURE, CORONARY, PERCUTANEOUS APPROACH: ICD-10-PCS | Performed by: INTERNAL MEDICINE

## 2023-01-13 PROCEDURE — 93571 IV DOP VEL&/PRESS C FLO 1ST: CPT | Performed by: INTERNAL MEDICINE

## 2023-01-13 PROCEDURE — 99153 MOD SED SAME PHYS/QHP EA: CPT | Performed by: INTERNAL MEDICINE

## 2023-01-13 PROCEDURE — 80048 BASIC METABOLIC PNL TOTAL CA: CPT | Performed by: HOSPITALIST

## 2023-01-13 PROCEDURE — B2111ZZ FLUOROSCOPY OF MULTIPLE CORONARY ARTERIES USING LOW OSMOLAR CONTRAST: ICD-10-PCS | Performed by: INTERNAL MEDICINE

## 2023-01-13 PROCEDURE — 99152 MOD SED SAME PHYS/QHP 5/>YRS: CPT | Performed by: INTERNAL MEDICINE

## 2023-01-13 PROCEDURE — 85347 COAGULATION TIME ACTIVATED: CPT

## 2023-01-13 PROCEDURE — 027034Z DILATION OF CORONARY ARTERY, ONE ARTERY WITH DRUG-ELUTING INTRALUMINAL DEVICE, PERCUTANEOUS APPROACH: ICD-10-PCS | Performed by: INTERNAL MEDICINE

## 2023-01-13 RX ORDER — SODIUM CHLORIDE 9 MG/ML
INJECTION, SOLUTION INTRAVENOUS CONTINUOUS
Status: ACTIVE | OUTPATIENT
Start: 2023-01-13 | End: 2023-01-13

## 2023-01-13 RX ORDER — MIDAZOLAM HYDROCHLORIDE 1 MG/ML
INJECTION INTRAMUSCULAR; INTRAVENOUS
Status: COMPLETED
Start: 2023-01-13 | End: 2023-01-13

## 2023-01-13 RX ORDER — NITROGLYCERIN 20 MG/100ML
INJECTION INTRAVENOUS
Status: COMPLETED
Start: 2023-01-13 | End: 2023-01-13

## 2023-01-13 RX ORDER — CLOPIDOGREL BISULFATE 75 MG/1
TABLET ORAL
Status: COMPLETED
Start: 2023-01-13 | End: 2023-01-13

## 2023-01-13 RX ORDER — VERAPAMIL HYDROCHLORIDE 2.5 MG/ML
INJECTION, SOLUTION INTRAVENOUS
Status: COMPLETED
Start: 2023-01-13 | End: 2023-01-13

## 2023-01-13 RX ORDER — LIDOCAINE HYDROCHLORIDE 10 MG/ML
INJECTION, SOLUTION EPIDURAL; INFILTRATION; INTRACAUDAL; PERINEURAL
Status: COMPLETED
Start: 2023-01-13 | End: 2023-01-13

## 2023-01-13 RX ORDER — CLOPIDOGREL BISULFATE 75 MG/1
75 TABLET ORAL DAILY
Status: DISCONTINUED | OUTPATIENT
Start: 2023-01-14 | End: 2023-01-14

## 2023-01-13 RX ORDER — HEPARIN SODIUM 5000 [USP'U]/ML
INJECTION, SOLUTION INTRAVENOUS; SUBCUTANEOUS
Status: COMPLETED
Start: 2023-01-13 | End: 2023-01-13

## 2023-01-13 RX ORDER — ASPIRIN 81 MG/1
81 TABLET ORAL DAILY
Status: DISCONTINUED | OUTPATIENT
Start: 2023-01-14 | End: 2023-01-14

## 2023-01-13 NOTE — PLAN OF CARE
Patient Aox4. Sating at 96% on room air. No complaints of pain. Lungs clear. SB (40s-50s) on tele monitor. Metoprolol held due to low HR. /70. Patient NPO for pending cath scheduled for 1600. Sips of water with baby aspirin given. IV access flushing appropriately. Patient up to chair. Call light within reach and chair alarm activated. Problem: Patient/Family Goals  Goal: Patient/Family Long Term Goal  Description: Patient's Long Term Goal: no chest pain    Interventions:  - cath lab today  - See additional Care Plan goals for specific interventions  Outcome: Progressing  Goal: Patient/Family Short Term Goal  Description: Patient's Short Term Goal: no falls    Interventions:   - call light and objects within reach  - See additional Care Plan goals for specific interventions  Outcome: Progressing     Problem: CARDIOVASCULAR - ADULT  Goal: Maintains optimal cardiac output and hemodynamic stability  Description: INTERVENTIONS:  - Monitor vital signs, rhythm, and trends  - Monitor for bleeding, hypotension and signs of decreased cardiac output  - Evaluate effectiveness of vasoactive medications to optimize hemodynamic stability  - Monitor arterial and/or venous puncture sites for bleeding and/or hematoma  - Assess quality of pulses, skin color and temperature  - Assess for signs of decreased coronary artery perfusion - ex.  Angina  - Evaluate fluid balance, assess for edema, trend weights  Outcome: Progressing  Goal: Absence of cardiac arrhythmias or at baseline  Description: INTERVENTIONS:  - Continuous cardiac monitoring, monitor vital signs, obtain 12 lead EKG if indicated  - Evaluate effectiveness of antiarrhythmic and heart rate control medications as ordered  - Initiate emergency measures for life threatening arrhythmias  - Monitor electrolytes and administer replacement therapy as ordered  Outcome: Progressing

## 2023-01-13 NOTE — PLAN OF CARE
Alert and oriented x4 on tele monitor hr 60's sinus rhythm. Tylenol 2 tabs given for headache. Instructed npo after mn for cardiac cath in am and verbalized understanding. Consent signed. All needs attended and will continue to monitor. Call light within reach. Problem: CARDIOVASCULAR - ADULT  Goal: Maintains optimal cardiac output and hemodynamic stability  Description: INTERVENTIONS:  - Monitor vital signs, rhythm, and trends  - Monitor for bleeding, hypotension and signs of decreased cardiac output  - Evaluate effectiveness of vasoactive medications to optimize hemodynamic stability  - Monitor arterial and/or venous puncture sites for bleeding and/or hematoma  - Assess quality of pulses, skin color and temperature  - Assess for signs of decreased coronary artery perfusion - ex.  Angina  - Evaluate fluid balance, assess for edema, trend weights  Outcome: Progressing  Goal: Absence of cardiac arrhythmias or at baseline  Description: INTERVENTIONS:  - Continuous cardiac monitoring, monitor vital signs, obtain 12 lead EKG if indicated  - Evaluate effectiveness of antiarrhythmic and heart rate control medications as ordered  - Initiate emergency measures for life threatening arrhythmias  - Monitor electrolytes and administer replacement therapy as ordered  Outcome: Progressing

## 2023-01-14 VITALS
TEMPERATURE: 98 F | SYSTOLIC BLOOD PRESSURE: 114 MMHG | RESPIRATION RATE: 18 BRPM | DIASTOLIC BLOOD PRESSURE: 65 MMHG | HEIGHT: 73 IN | OXYGEN SATURATION: 100 % | HEART RATE: 57 BPM | WEIGHT: 200.19 LBS | BODY MASS INDEX: 26.53 KG/M2

## 2023-01-14 LAB
ANION GAP SERPL CALC-SCNC: 1 MMOL/L (ref 0–18)
BUN BLD-MCNC: 19 MG/DL (ref 7–18)
CALCIUM BLD-MCNC: 9.2 MG/DL (ref 8.5–10.1)
CHLORIDE SERPL-SCNC: 107 MMOL/L (ref 98–112)
CO2 SERPL-SCNC: 30 MMOL/L (ref 21–32)
CREAT BLD-MCNC: 0.93 MG/DL
ERYTHROCYTE [DISTWIDTH] IN BLOOD BY AUTOMATED COUNT: 11.8 %
GFR SERPLBLD BASED ON 1.73 SQ M-ARVRAT: 94 ML/MIN/1.73M2 (ref 60–?)
GLUCOSE BLD-MCNC: 100 MG/DL (ref 70–99)
HCT VFR BLD AUTO: 42.4 %
HGB BLD-MCNC: 13.9 G/DL
MAGNESIUM SERPL-MCNC: 2.2 MG/DL (ref 1.6–2.6)
MCH RBC QN AUTO: 31.2 PG (ref 26–34)
MCHC RBC AUTO-ENTMCNC: 32.8 G/DL (ref 31–37)
MCV RBC AUTO: 95.3 FL
OSMOLALITY SERPL CALC.SUM OF ELEC: 288 MOSM/KG (ref 275–295)
PLATELET # BLD AUTO: 158 10(3)UL (ref 150–450)
POTASSIUM SERPL-SCNC: 3.8 MMOL/L (ref 3.5–5.1)
RBC # BLD AUTO: 4.45 X10(6)UL
SODIUM SERPL-SCNC: 138 MMOL/L (ref 136–145)
WBC # BLD AUTO: 5.8 X10(3) UL (ref 4–11)

## 2023-01-14 PROCEDURE — 80048 BASIC METABOLIC PNL TOTAL CA: CPT | Performed by: HOSPITALIST

## 2023-01-14 PROCEDURE — 83735 ASSAY OF MAGNESIUM: CPT | Performed by: HOSPITALIST

## 2023-01-14 PROCEDURE — 85027 COMPLETE CBC AUTOMATED: CPT | Performed by: HOSPITALIST

## 2023-01-14 PROCEDURE — 99211 OFF/OP EST MAY X REQ PHY/QHP: CPT

## 2023-01-14 RX ORDER — CLOPIDOGREL BISULFATE 75 MG/1
75 TABLET ORAL DAILY
Qty: 90 TABLET | Refills: 3 | Status: SHIPPED | OUTPATIENT
Start: 2023-01-14 | End: 2024-01-09

## 2023-01-14 NOTE — PLAN OF CARE
Received pt at 1930. Pt is A&O x 4; follows commands. Family at bedside. Pt denies CP or SOB. Pt is on room air with O2 saturation at  98, VSS, and NSR on tele. Cardiac diet, thin liquids restarted after cath performed this afternoon. TR Band inflated and in place- no bleeding noted. Pt is continent of bowel and bladder. Pt denies pain and ambulates with independently though has agreed to call staff for ambulatory assistance. POC discussed with pt who verbalized understanding. Shift assessment completed, TR band successfully removed. 4x4 and gauze in place. Reassessment shows site to be [facility] [id number] [facility]; bed in low position, alarm on, call light and personal items within reach. Problem: CARDIOVASCULAR - ADULT  Goal: Maintains optimal cardiac output and hemodynamic stability  Description: INTERVENTIONS:  - Monitor vital signs, rhythm, and trends  - Monitor for bleeding, hypotension and signs of decreased cardiac output  - Evaluate effectiveness of vasoactive medications to optimize hemodynamic stability  - Monitor arterial and/or venous puncture sites for bleeding and/or hematoma  - Assess quality of pulses, skin color and temperature  - Assess for signs of decreased coronary artery perfusion - ex.  Angina  - Evaluate fluid balance, assess for edema, trend weights  Outcome: Progressing  Goal: Absence of cardiac arrhythmias or at baseline  Description: INTERVENTIONS:  - Continuous cardiac monitoring, monitor vital signs, obtain 12 lead EKG if indicated  - Evaluate effectiveness of antiarrhythmic and heart rate control medications as ordered  - Initiate emergency measures for life threatening arrhythmias  - Monitor electrolytes and administer replacement therapy as ordered  Outcome: Progressing

## 2023-01-14 NOTE — DIETARY NOTE
Clinical Nutrition    Dietitian consult received per cardiac rehab standing order. Pt to be educated by cardiac rehab staff and encouraged to attend outpatient classes taught by RD. RD available PRN.     Maria Alejandra Solorzano MS, RD, LDN  Clinical Dietitian  Pager #: [phone]

## 2023-01-14 NOTE — PLAN OF CARE
Patient returned from cath lab. Patient with no complaints of pain. TR band to Right radial. Pulses +2. No complaints of numbness or tingling. Cap refill <3. Fluids started at 50cc. Patient encouraged to drink fluids. Urinal at bedside. Patient educated on the importance of calling to get up due to anesthesia being given. Patient vocalizes understanding.

## 2023-01-14 NOTE — CARDIAC REHAB
CAD/stent education completed with pt. Offered/discussed phase 2 CR; pt declined scheduling appt now. Pt has our contact info.

## 2023-01-14 NOTE — PLAN OF CARE
Assumed care at 0730. Pt is A&Ox4. Pt is on RA with lung sounds clear. NSR on tele, VSS. No complaints of cardiac symptoms. Continent of B&B. Denies pain at this time. Up independently, tolerating well. Plan of care reviewed with patient, verbalizes understanding, all needs addressed at this time, pt seems to be resting comfortably. Bed locked and in lowest position. Call light at bedside. POC:  DC today    1135: Taylor Torres orders to go home     Problem: CARDIOVASCULAR - ADULT  Goal: Maintains optimal cardiac output and hemodynamic stability  Description: INTERVENTIONS:  - Monitor vital signs, rhythm, and trends  - Monitor for bleeding, hypotension and signs of decreased cardiac output  - Evaluate effectiveness of vasoactive medications to optimize hemodynamic stability  - Monitor arterial and/or venous puncture sites for bleeding and/or hematoma  - Assess quality of pulses, skin color and temperature  - Assess for signs of decreased coronary artery perfusion - ex.  Angina  - Evaluate fluid balance, assess for edema, trend weights  1/14/2023 1026 by Eddie Aguillon RN  Outcome: Progressing  1/14/2023 0836 by Eddie Aguillon RN  Outcome: Progressing  Goal: Absence of cardiac arrhythmias or at baseline  Description: INTERVENTIONS:  - Continuous cardiac monitoring, monitor vital signs, obtain 12 lead EKG if indicated  - Evaluate effectiveness of antiarrhythmic and heart rate control medications as ordered  - Initiate emergency measures for life threatening arrhythmias  - Monitor electrolytes and administer replacement therapy as ordered  1/14/2023 1026 by Eddie Aguillon RN  Outcome: Progressing  1/14/2023 0836 by Eddie Aguillon RN  Outcome: Progressing

## 2023-01-14 NOTE — PLAN OF CARE
Pt DC from floor at 1250 via WC to [facility] [address] lobby to be taken home by wife. VSS, IV and tele removed. Discharge instructions given, education given, all questions answered. No further concerns.

## 2023-01-14 NOTE — PROCEDURES
[facility] Location: Cath Lab    CSN [id number] MRN [id number]   Admission Date 1/11/2023 Procedure Date 1/13/2023   Attending Physician Sadi Ferguson DO Procedure Physician Carol Hodge MD         CARDIAC CATHETERIZATION/PERCUTANEOUS CORONARY INTERVENTION REPORT     PREOPERATIVE DIAGNOSIS:  unstable angina, hx of CAD s/p prior PCI to LAD  POSTOPERATIVE DIAGNOSIS:  same as above. PROCEDURE PERFORMED:  left heart catheterization, left ventriculogram, selective coronary angiography, FFR guided PCI to the proximal LAD      PROCEDURE:  The patient was brought to the cardiac catheterization lab in the fasting state. Informed consent was obtained. Moderate sedation was employed using a total of IV Versed 4mg and IV fentanyl 100mcg. I directly observed the patient from [phone] to [phone], for a total of 50 minutes, and an independent trained observer was present and assisted in the monitoring of the patient's level of consciousness and physiological status, watching the heart rate, blood pressure, oximetry, and rhythm, in addition to total moderation time. ACCESS/CATHETER PLACEMENT:   The right radial area was prepped and draped in a sterile manner and anesthetized with 2% lidocaine. The right radial artery was accessed, and a 6-Frisian, 11 cm sheath was placed. Left and right selective coronary angiography was performed using a 6F Tiger4. 0 catheter. A pigtail catheter was used to cross the aortic valve, measure left ventricular pressure and perform a 30 degree AMEZCUA ventriculogram.  The catheter was pulled across the valve to assess for aortic stenosis. At the conclusion of the study, the right radial artery hemostasis was performed using a radial band. FINDINGS:      1.   Left heart catheterization:    Left ventricle: 120/1 mmHg  Aorta: 112/67/89 mmHg  Left ventriculogram demonstrated a LV ejection fraction of 60-65% without significant mitral regurgitation; there are no regional wall motion abnormalities. There was no aortic stenosis upon pullback of the catheter. 2.  Selective coronary angiography:      Left main artery: The left main artery is a medium size bifurcating vessel without significant angiographic disease. LAD:  The left anterior descending artery is a medium caliber vessel that gives rise to three small diagonal branches. The proximal LAD demonstrates a focal 70% stenosis just proximal to the overlapping stents in the mid vessel. The stents themselves are patent without significant re-stenosis. The mid-distal LAD demonstrates a 50% focal stenosis at the takeoff of the third diagonal branch. LCx: The left circumflex artery is a medium size vessel that gives rise to a mid branching obtuse marginal artery. There is mild-moderate diffuse disease noted in the LCx and its branches. RCA:  The right coronary artery is a medium size, dominant vessel that gives rise to a medium rPDA and rPL branch. The distal RCA demonstrates a focal 50% stenosis. INTERVENTIONAL PROCEDURE: Heparin was used for systemic anticoagulation. The area of angiographic concern was the proximal focal LAD lesion, which appeared as a step down, just proximal to the stent edge. The LM artery was engaged with a 6F EBU3.5 guide catheter, and DFR was performed with [facility] Comet wire = 0.84. Decision was made to perform PCI; following pre-dilation with a 2.5x12m balloon, a 3.0x15mm [place] CHICHI was deployed and post-dilated at the overlapping segment with a 3.5x12mm NC balloon to high pressure. Completion angiography demonstrated a good result with 0% residual stenosis, no dissection/perforation was noted. Distal flow was TIMI3. The procedure was tolerated well. MEDICATIONS:  See nursing record. COMPLICATIONS:  No major complications were observed during this visit to the catheterization lab. IMPRESSION:    1. Left heart catheterization: LVEDP 1 mmHg, no aortic stenosis.   LVEF 60-65% with normal wall motion, no mitral regurgitation. 2.  Coronary angiography:  right dominant system  - LM: no significant angiographic disease  - LAD: 70% proximal stenosis, patent mid stents, 50% distal stenosis  - LCx: mild-moderate diffuse disease  - RCA: 50% distal stenosis. 3. Percutaneous coronary intervention:  Successful DFR guided PCI to the proximal LAD with a 3.0x15mm [place] CHICHI, post-dilated to 3.5mm. RECOMMENDATIONS: DAPT (asa/clopidogrel) x 3-6 months uninterrupted.

## 2023-01-18 LAB — ISTAT ACTIVATED CLOTTING TIME: 299 SECONDS (ref 74–137)

## (undated) NOTE — LETTER
[facility] [address], [address]  Consent for Procedure/Sedation    Date: 02/23/2021    Time: 0950      1.  I authorize the performance upon Nathaniel Mir the following:cardiac catheterization, left ventricular cineangiography, Signature of person authorized                                     Relationship to  to consent for patient: _________________________ patient: ___________________    Witness: _______________________________ Date: _____________________    Printed: 2/23/2021

## (undated) NOTE — LETTER
[facility] [address], [address]  Consent for Procedure/Sedation  Date: 1-12-23           Time: 1411    1. I hereby authorize Dr. Lee Collins, my physician and his/her assistants (if applicable), which may include medical students, residents, and/or fellows, to perform the following surgical operation/ procedure and administer such anesthesia as may be determined necessary by my physician:  Operation/Procedure name (s)  Cardiac Catheterization, Left Ventricular Cineangiography, Bilateral Selective Coronary Angiography and/or Right Heart Catheterization; possible Percutaneous Transluminal Coronary Angioplasty, Coronary Atherectomy, Coronary Stent, Intracoronary Thrombolytic therapy, Antiplatelet therapy and/or Intravascular Ultrasound on Ced[address] Stech   2.   I recognize that during the surgical operation/procedure, unforeseen conditions may necessitate additional or different procedures than those listed above. I, therefore, further authorize and request that the above-named surgeon, assistants, or designees perform such procedures as are, in their judgment, necessary and desirable. 3.   My surgeon/physician has discussed prior to my surgery the potential benefits, risks and side effects of this procedure; the likelihood of achieving goals; and potential problems that might occur during recuperation. They also discussed reasonable alternatives to the procedure, including risks, benefits, and side effects related to the alternatives and risks related to not receiving this procedure. I have had all my questions answered and I acknowledge that no guarantee has been made as to the result that may be obtained. 4.   Should the need arise during my operation/procedure, which includes change of level of care prior to discharge, I also consent to the administration of blood and/or blood products.   Further, I understand that despite careful testing and screening of blood or blood products by collecting agencies, I may still be subject to ill effects as a result of receiving a blood transfusion and/or blood products. The following are some, but not all, of the potential risks that can occur: fever and allergic reactions, hemolytic reactions, transmission of diseases such as Hepatitis, AIDS and Cytomegalovirus (CMV) and fluid overload. In the event that I wish to have an autologous transfusion of my own blood, or a directed donor transfusion, I will discuss this with my physician. Check only if Refusing Blood or Blood Products  I understand refusal of blood or blood products as deemed necessary by my physician may have serious consequences to my condition to include possible death. I hereby assume responsibility for my refusal and release the hospital, its personnel, and my physicians from any responsibility for the consequences of my refusal.          o  Refuse      5. I authorize the use of any specimen, organs, tissues, body parts or foreign objects that may be removed from my body during the operation/procedure for diagnosis, research or teaching purposes and their subsequent disposal by hospital authorities. I also authorize the release of specimen test results and/or written reports to my treating physician on the hospital medical staff or other referring or consulting physicians involved in my care, at the discretion of the Pathologist or my treating physician. 6.   I consent to the photographing or videotaping of the operations or procedures to be performed, including appropriate portions of my body for medical, scientific, or educational purposes, provided my identity is not revealed by the pictures or by descriptive texts accompanying them. If the procedure has been photographed/videotaped, the surgeon will obtain the original picture, image, videotape or CD.   The hospital will not be responsible for storage, release or maintenance of the picture, image, tape or CD.    7.   I consent to the presence of a  or observers in the operating room as deemed necessary by my physician or their designees. 8.   I recognize that in the event my procedure results in extended X-Ray/fluoroscopy time, I may develop a skin reaction. 9. If I have a Do Not Attempt Resuscitation (DNAR) order in place, that status will be suspended while in the operating room, procedural suite, and during the recovery period unless otherwise explicitly stated by me (or a person authorized to consent on my behalf). The surgeon or my attending physician will determine when the applicable recovery period ends for purposes of reinstating the DNAR order. 10. Patients having a sterilization procedure: I understand that if the procedure is successful the results will be permanent and it will therefore be impossible for me to inseminate, conceive, or bear children. I also understand that the procedure is intended to result in sterility, although the result has not been guaranteed. 11. I acknowledge that my physician has explained sedation/analgesia administration to me including the risk and benefits I consent to the administration of sedation/analgesia as may be necessary or desirable in the judgment of my physician.     I CERTIFY THAT I HAVE READ AND FULLY UNDERSTAND THE ABOVE CONSENT TO OPERATION and/or OTHER PROCEDURE.        ____________________________________       _________________________________      ______________________________  Signature of Patient         Signature of Responsible Person        Printed Name of Responsible Person    ____________________________________      _________________________________      ______________________________       Signature of Witness          Relationship to Patient                       Date                                       Time  Patient Name: Jessica Dow     : 1960                 Printed: 2023      Medical Record #: [id number] Page 1 of 2